# Patient Record
Sex: MALE
[De-identification: names, ages, dates, MRNs, and addresses within clinical notes are randomized per-mention and may not be internally consistent; named-entity substitution may affect disease eponyms.]

---

## 2018-12-17 ENCOUNTER — HOSPITAL ENCOUNTER (OUTPATIENT)
Dept: HOSPITAL 25 - OR | Age: 48
Discharge: HOME | End: 2018-12-17
Attending: SURGERY
Payer: COMMERCIAL

## 2018-12-17 VITALS — DIASTOLIC BLOOD PRESSURE: 83 MMHG | SYSTOLIC BLOOD PRESSURE: 124 MMHG

## 2018-12-17 DIAGNOSIS — F41.9: ICD-10-CM

## 2018-12-17 DIAGNOSIS — Z87.891: ICD-10-CM

## 2018-12-17 DIAGNOSIS — K40.90: Primary | ICD-10-CM

## 2018-12-17 NOTE — BRIEFOPN
Brief Operative Note





- Surgery


Procedures: 


Procedures





OPERATIVE REPORT





PRE-OP: Left inguinal hernia





POST-OP:left indirect inguinal hernia





PROCEDURE: Open repair with mesh of a left inguinal hernia





SURGEON: MD Fransisco


ANESTHESIA:Local with MAC         Dr. Hinkle


ASST:none


IVF: one liter of crystalloid


EBL:min


SPECIMEN:none


DRAIN: none


WOUND CLASS:One


COMPLICATIONS: none


TO PACU

## 2018-12-17 NOTE — OP
DATE OF OPERATION:  12/17/18 - [facility]

 

DATE OF BIRTH:  [birth year]

 

SURGEON:  Amos Moody MD

 

ASSISTANT:  None.

 

ANESTHESIOLOGIST:  Dr. Hinkle.

 

ANESTHESIA:  Local with monitored anesthesia care.

 

PRE-OP DIAGNOSIS:  Left inguinal hernia.

 

POST-OP DIAGNOSIS:  Left indirect inguinal hernia.

 

OPERATIVE PROCEDURE:  Open repair of left indirect inguinal hernia with mesh.

 

ESTIMATED BLOOD LOSS:  Minimal.

 

WOUND CLASSIFICATION:  I.

 

DRAINS:  None.

 

COMPLICATIONS:  None.

 

DESCRIPTION OF PROCEDURE:  Written informed consent was obtained.  The left 
groin was marked with indelible ink and preoperative antibiotics were 
administered.  The patient was taken to the operating room and placed in the 
supine position. Sequential compression devices and a warming blanket were 
applied.  The left groin and lower abdomen were prepped and draped in the usual 
sterile fashion.

 

Time-out verification was completed.

 

1% lidocaine was infiltrated in the left groin and an oblique incision was made 
several fingerbreadths above the inguinal crease.  This was carried down 
through Cedrick's fascia.  The external oblique aponeurosis was identified and 
opened in the direction of its fibers to expose the underlying inguinal floor.

 

The spermatic cord was encircled with a one-quarter inch Penrose drain at the 
pubic tubercle.

 

Careful dissection revealed no direct space hernia.

 

Careful evaluation of the spermatic cord revealed a left indirect inguinal 
hernia sac, which was  from the cord structures including the vas 
deferens and reduced up into the internal ring, which was also slightly larger 
in size than expected and normal appearing.

 

Care was taken to prevent injury to the vas deferens and other structures.

 

Next, the Covidien ProGrip pre-cut self-gripping mesh was then placed and 
sutured to the pubic tubercle medially with a 0 Vicryl suture.  It was placed 
to adhere to the conjoint tendon superiorly and the musculature laterally as 
well as the inguinal ligament inferiorly and the inguinal ring was 
reconstructed.  No other sutures were placed.

 

Hemostasis was assured.  Additional lidocaine was infiltrated.  The external 
oblique aponeurosis was closed with a running 3-0 Vicryl suture.  Cedrick's 
fascia was closed with a running 3-0 Vicryl suture.  The skin was approximated 
with a subcuticular 4-0 Vicryl suture.  Steri-Strips and sterile dressings were 
applied. The patient tolerated the procedure well and was taken to the recovery 
room in stable condition.

 

 [id number]/[facility] #: [id number]

MTDD